# Patient Record
Sex: FEMALE | Employment: STUDENT | URBAN - METROPOLITAN AREA
[De-identification: names, ages, dates, MRNs, and addresses within clinical notes are randomized per-mention and may not be internally consistent; named-entity substitution may affect disease eponyms.]

---

## 2020-11-13 ENCOUNTER — THERAPY VISIT (OUTPATIENT)
Dept: PHYSICAL THERAPY | Facility: CLINIC | Age: 25
End: 2020-11-13
Payer: COMMERCIAL

## 2020-11-13 DIAGNOSIS — M22.2X9 PATELLOFEMORAL PAIN SYNDROME: Primary | ICD-10-CM

## 2020-11-13 PROCEDURE — 97161 PT EVAL LOW COMPLEX 20 MIN: CPT | Mod: GP | Performed by: PHYSICAL THERAPIST

## 2020-11-13 PROCEDURE — 97110 THERAPEUTIC EXERCISES: CPT | Mod: GP | Performed by: PHYSICAL THERAPIST

## 2020-11-13 PROCEDURE — 97530 THERAPEUTIC ACTIVITIES: CPT | Mod: GP | Performed by: PHYSICAL THERAPIST

## 2020-11-13 ASSESSMENT — ACTIVITIES OF DAILY LIVING (ADL)
STIFFNESS: THE SYMPTOM AFFECTS MY ACTIVITY MODERATELY
RISE FROM A CHAIR: NOT ANSWERED
KNEEL ON THE FRONT OF YOUR KNEE: NOT ANSWERED
GO DOWN STAIRS: NOT ANSWERED
STAND: NOT ANSWERED
SWELLING: I DO NOT HAVE THE SYMPTOM
GIVING WAY, BUCKLING OR SHIFTING OF KNEE: THE SYMPTOM AFFECTS MY ACTIVITY SLIGHTLY
WEAKNESS: I DO NOT HAVE THE SYMPTOM
SIT WITH YOUR KNEE BENT: NOT ANSWERED
WALK: NOT ANSWERED
SQUAT: NOT ANSWERED
KNEE_ACTIVITY_OF_DAILY_LIVING_SUM: 19
GO UP STAIRS: NOT ANSWERED
PAIN: THE SYMPTOM AFFECTS MY ACTIVITY MODERATELY
LIMPING: THE SYMPTOM AFFECTS MY ACTIVITY SLIGHTLY

## 2020-11-13 NOTE — LETTER
KANDICE SNELL PT  [address]  [address]  [place] [address]  [phone]    2020    Re: Ely Pandey   :   1995  MRN:  [id number]   REFERRING PHYSICIAN:   MD KANDICE Edouard PT  Date of Initial Evaluation:  2020  Visits:  Rxs Used: 1  Reason for Referral:  Patellofemoral pain syndrome    EVALUATION SUMMARY    [facility] Initial Evaluation  Subjective:  Therapist Generated HPI Evaluation  Problem details: Patient reports the onset of left anterior knee pain 4 years ago while she was playing volleyball. Patient reports the pain continued through the years and recently worsened in 2020 after as her knee pain became more irritable.  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         Type of problem:  Left knee.    This is a chronic condition.  Condition occurred with:  Repetition/overuse.  Where condition occurred: during recreation/sport.  Patient reports pain:  Anterior.  Pain is described as aching and is constant.  Pain is worse during the day.  Since onset symptoms are unchanged.  Associated symptoms:  Loss of motion/stiffness, loss of strength and buckling/giving out (feelsing of giving way). Symptoms are exacerbated by bending/squatting, descending stairs, ascending stairs, walking and standing  Relieved by: patellar tendon strap helps some.    Past treatment: none.   Restrictions due to condition include:  Working in normal job without restrictions.  Barriers include:  None as reported by patient.             Patient Health History  General health as reported by patient is fair.  Pertinent medical history includes: none.   Red flags:  None as reported by patient.  Medical allergies: none.   Surgeries include:  None.    Current medications:  Other.    Re: Ely ARANDA Pandey   :    1995    Current occupation .   Primary job tasks include:  Computer work and prolonged sitting.     Objective:  Standing Alignment:    Shoulder/UE:  Rounded shoulders    Gait:  Patient reports walking with a limp the day after intense activity  Gait Type:  Normal   Assistive Devices:  None          Hip Evaluation  Hip Strength:    Flexion:   Left: 5/5   Pain:  Right: 5/5   Pain:               Extension:  Left: 4/5  Pain:Right: 4/5    Pain:    Abduction:  Left: 4-/5     Pain:Right: 4+/5    Pain:  Adduction:  Left: 5/5    Pain:Right: 5/5   Pain:  Internal Rotation:  Left: 4+/5    Pain:Right: 4+/5   Pain:  External Rotation:  Left: 4-/5   Pain:  Right: 4-/5   Pain:    Knee Evaluation:  ROM:  AROM: normal  Strength wnl knee: grossly 5/5 bilaterally.  Pain: none reported with AROM or strength testing    Ligament Testing:  Normal    Special Tests:   Left knee positive for the following special tests:  Patellar Compression    Palpation:    Left knee tenderness present at:  Patellar Lateral    Edema:  Normal  Mobility Testing:  Normal    Functional Testing:    Quad:    Single Leg Squat:  Left:      Right:        Bilateral Leg Squat:   Moderate loss of control, femoral IR and excessive anterior knee excursion      Assessment/Plan:    Patient is a 24 year old female with left side knee complaints.    Patient has the following significant findings with corresponding treatment plan.                Diagnosis 1:  Left knee PFPS  Pain -  hot/cold therapy, splint/taping/bracing/orthotics, self management, education and home program  Decreased strength - therapeutic exercise, therapeutic activities and home program  Decreased function - therapeutic activities and home program      Re: Ely Pandey   :   1995    Therapy Evaluation Codes:   1) History comprised of:   Personal factors that impact the plan of care:      None.    Comorbidity factors that impact the plan of care are:      None.      Medications impacting care: None.  2) Examination of Body Systems comprised of:   Body structures and functions that impact the plan of care:      Knee.   Activity limitations that impact the plan of care are:      Running, Sports, Squatting/kneeling, Stairs and Walking.  3) Clinical presentation characteristics are:   Stable/Uncomplicated.  4) Decision-Making    Low complexity using standardized patient assessment instrument and/or measureable assessment of functional outcome.  Cumulative Therapy Evaluation is: Low complexity.    Previous and current functional limitations:  (See Goal Flow Sheet for this information)    Short term and Long term goals: (See Goal Flow Sheet for this information)     Communication ability:  Patient appears to be able to clearly communicate and understand verbal and written communication and follow directions correctly.  Treatment Explanation - The following has been discussed with the patient:   RX ordered/plan of care  Anticipated outcomes  Possible risks and side effects  This patient would benefit from PT intervention to resume normal activities.   Rehab potential is good.    Frequency:  1 X week, once daily  Duration:  for 6 weeks  Discharge Plan:  Achieve all LTG.  Independent in home treatment program.  Reach maximal therapeutic benefit.    Please refer to the daily flowsheet for treatment today, total treatment time and time spent performing 1:1 timed codes.     Thank you for your referral.    INQUIRIES  Therapist: Marko Maldonado, PT, DPT  KANDICE SNELL PT  [address]  [address]  [place] [address]  Phone: [phone]  Fax: [phone]

## 2020-11-13 NOTE — PROGRESS NOTES
[facility] Initial Evaluation  Subjective:    Therapist Generated HPI Evaluation  Problem details: Patient reports the onset of left anterior knee pain 4 years ago while she was playing volleyball. Patient reports the pain continued through the years and recently worsened in June 2020 after as her knee pain became more irritable.  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         Type of problem:  Left knee.    This is a chronic condition.  Condition occurred with:  Repetition/overuse.  Where condition occurred: during recreation/sport.  Patient reports pain:  Anterior.  Pain is described as aching and is constant.  Pain is worse during the day.  Since onset symptoms are unchanged.  Associated symptoms:  Loss of motion/stiffness, loss of strength and buckling/giving out (feelsing of giving way). Symptoms are exacerbated by bending/squatting, descending stairs, ascending stairs, walking and standing  Relieved by: patellar tendon strap helps some.    Past treatment: none.   Restrictions due to condition include:  Working in normal job without restrictions.  Barriers include:  None as reported by patient.                        Objective:  Standing Alignment:      Shoulder/UE:  Rounded shoulders              Gait:  Patient reports walking with a limp the day after intense activity  Gait Type:  Normal   Assistive Devices:  None                                                   Hip Evaluation    Hip Strength:    Flexion:   Left: 5/5   Pain:  Right: 5/5   Pain:                    Extension:  Left: 4/5  Pain:Right: 4/5    Pain:    Abduction:  Left: 4-/5     Pain:Right: 4+/5    Pain:  Adduction:  Left: 5/5    Pain:Right: 5/5   Pain:  Internal Rotation:  Left: 4+/5    Pain:Right: 4+/5   Pain:  External Rotation:  Left: 4-/5   Pain:  Right: 4-/5   Pain:                      Knee Evaluation:  ROM:  AROM: normal  Strength wnl knee: grossly 5/5 bilaterally.      Pain: none reported with AROM or strength testing      Ligament Testing:  Normal                Special Tests:   Left knee positive for the following special tests:  Patellar Compression    Palpation:    Left knee tenderness present at:  Patellar Lateral    Edema:  Normal    Mobility Testing:  Normal            Functional Testing:          Quad:    Single Leg Squat:  Left:      Right:        Bilateral Leg Squat:   Moderate loss of control, femoral IR and excessive anterior knee excursion              General     ROS    Assessment/Plan:    Patient is a 24 year old female with left side knee complaints.    Patient has the following significant findings with corresponding treatment plan.                Diagnosis 1:  Left knee PFPS  Pain -  hot/cold therapy, splint/taping/bracing/orthotics, self management, education and home program  Decreased strength - therapeutic exercise, therapeutic activities and home program  Decreased function - therapeutic activities and home program    Therapy Evaluation Codes:   1) History comprised of:   Personal factors that impact the plan of care:      None.    Comorbidity factors that impact the plan of care are:      None.     Medications impacting care: None.  2) Examination of Body Systems comprised of:   Body structures and functions that impact the plan of care:      Knee.   Activity limitations that impact the plan of care are:      Running, Sports, Squatting/kneeling, Stairs and Walking.  3) Clinical presentation characteristics are:   Stable/Uncomplicated.  4) Decision-Making    Low complexity using standardized patient assessment instrument and/or measureable assessment of functional outcome.  Cumulative Therapy Evaluation is: Low complexity.    Previous and current functional limitations:  (See Goal Flow Sheet for this information)    Short term and Long term goals: (See Goal Flow  Sheet for this information)     Communication ability:  Patient appears to be able to clearly communicate and understand verbal and written communication and follow directions correctly.  Treatment Explanation - The following has been discussed with the patient:   RX ordered/plan of care  Anticipated outcomes  Possible risks and side effects  This patient would benefit from PT intervention to resume normal activities.   Rehab potential is good.    Frequency:  1 X week, once daily  Duration:  for 6 weeks  Discharge Plan:  Achieve all LTG.  Independent in home treatment program.  Reach maximal therapeutic benefit.    Please refer to the daily flowsheet for treatment today, total treatment time and time spent performing 1:1 timed codes.

## 2020-11-13 NOTE — PROGRESS NOTES
[facility] Initial Evaluation  Subjective:    Patient Health History           General health as reported by patient is fair.  Pertinent medical history includes: none.   Red flags:  None as reported by patient.  Medical allergies: none.   Surgeries include:  None.    Current medications:  Other.    Current occupation .   Primary job tasks include:  Computer work and prolonged sitting.                                    Objective:  System    Physical Exam    General     ROS    Assessment/Plan:

## 2020-11-27 ENCOUNTER — THERAPY VISIT (OUTPATIENT)
Dept: PHYSICAL THERAPY | Facility: CLINIC | Age: 25
End: 2020-11-27
Payer: COMMERCIAL

## 2020-11-27 DIAGNOSIS — M22.2X9 PATELLOFEMORAL PAIN SYNDROME: ICD-10-CM

## 2020-11-27 PROCEDURE — 97530 THERAPEUTIC ACTIVITIES: CPT | Mod: GP | Performed by: PHYSICAL THERAPIST

## 2020-11-27 PROCEDURE — 97112 NEUROMUSCULAR REEDUCATION: CPT | Mod: GP | Performed by: PHYSICAL THERAPIST

## 2020-11-27 PROCEDURE — 97110 THERAPEUTIC EXERCISES: CPT | Mod: GP | Performed by: PHYSICAL THERAPIST

## 2020-12-18 ENCOUNTER — THERAPY VISIT (OUTPATIENT)
Dept: PHYSICAL THERAPY | Facility: CLINIC | Age: 25
End: 2020-12-18
Payer: COMMERCIAL

## 2020-12-18 DIAGNOSIS — M22.2X9 PATELLOFEMORAL PAIN SYNDROME: ICD-10-CM

## 2020-12-18 PROCEDURE — 97110 THERAPEUTIC EXERCISES: CPT | Mod: GP | Performed by: PHYSICAL THERAPIST

## 2020-12-18 PROCEDURE — 97530 THERAPEUTIC ACTIVITIES: CPT | Mod: GP | Performed by: PHYSICAL THERAPIST

## 2020-12-18 PROCEDURE — 97112 NEUROMUSCULAR REEDUCATION: CPT | Mod: GP | Performed by: PHYSICAL THERAPIST

## 2020-12-31 ENCOUNTER — THERAPY VISIT (OUTPATIENT)
Dept: PHYSICAL THERAPY | Facility: CLINIC | Age: 25
End: 2020-12-31
Payer: COMMERCIAL

## 2020-12-31 DIAGNOSIS — M22.2X9 PATELLOFEMORAL PAIN SYNDROME: ICD-10-CM

## 2020-12-31 PROCEDURE — 97112 NEUROMUSCULAR REEDUCATION: CPT | Mod: GP | Performed by: PHYSICAL THERAPIST

## 2020-12-31 PROCEDURE — 97110 THERAPEUTIC EXERCISES: CPT | Mod: GP | Performed by: PHYSICAL THERAPIST

## 2020-12-31 PROCEDURE — 97530 THERAPEUTIC ACTIVITIES: CPT | Mod: GP | Performed by: PHYSICAL THERAPIST

## 2020-12-31 NOTE — PROGRESS NOTES
Subjective:  HPI  Physical Exam                    Objective:  System    Physical Exam    General     ROS    Assessment/Plan:    DISCHARGE REPORT    Progress reporting period is from 11/17/2020 to 12/31/2020.       SUBJECTIVE  Subjective changes noted by patient:   Subjective: Patient reports one episode of knee pain since last PT session. She reports it was a result of doing her exercises 3 days in a row.  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     Previous pain level was   Initial Pain level: 5/10.   Changes in function:  Yes (See Goal flowsheet attached for changes in current functional level)  Adverse reaction to treatment or activity: None    OBJECTIVE  Changes noted in objective findings:  Yes,   Objective: Left knee AROM WNL throughout. left knee strength grossly 5/5. good pelvifemoral stability with single leg jumping. No knee pain reported      ASSESSMENT/PLAN  Updated problem list and treatment plan: Diagnosis 1:  Left PFPS    STG/LTGs have been met or progress has been made towards goals:  Yes (See Goal flow sheet completed today.)  Assessment of Progress: The patient's condition is improving.  The patient has met all of their long term goals.  Self Management Plans:  Patient is independent in a home treatment program.  Patient is independent in self management of symptoms.  I have re-evaluated this patient and find that the nature, scope, duration and intensity of the therapy is appropriate for the medical condition of the patient.  Ely continues to require the following intervention to meet STG and LTG's:  PT intervention is no longer required to meet STG/LTG.    Recommendations:  This patient is ready to be discharged from therapy and continue their home treatment program.    Please refer to the daily flowsheet for treatment today, total treatment time and time spent  performing 1:1 timed codes.

## 2021-05-30 PROBLEM — M22.2X9 PATELLOFEMORAL PAIN SYNDROME: Status: RESOLVED | Noted: 2020-11-13 | Resolved: 2021-05-30
